# Patient Record
Sex: FEMALE | Race: WHITE | NOT HISPANIC OR LATINO | Employment: FULL TIME | URBAN - METROPOLITAN AREA
[De-identification: names, ages, dates, MRNs, and addresses within clinical notes are randomized per-mention and may not be internally consistent; named-entity substitution may affect disease eponyms.]

---

## 2017-07-10 ENCOUNTER — APPOINTMENT (OUTPATIENT)
Dept: WOMENS IMAGING | Facility: HOSPITAL | Age: 67
End: 2017-07-10

## 2017-07-10 PROCEDURE — 77067 SCR MAMMO BI INCL CAD: CPT | Performed by: RADIOLOGY

## 2017-09-13 ENCOUNTER — HOSPITAL ENCOUNTER (EMERGENCY)
Dept: HOSPITAL 23 - CED | Age: 67
Discharge: HOME | End: 2017-09-13
Payer: COMMERCIAL

## 2017-09-13 VITALS — WEIGHT: 229.99 LBS | HEIGHT: 66 IN | BODY MASS INDEX: 36.96 KG/M2

## 2017-09-13 DIAGNOSIS — Z79.899: ICD-10-CM

## 2017-09-13 DIAGNOSIS — R51: Primary | ICD-10-CM

## 2017-09-13 DIAGNOSIS — I10: ICD-10-CM

## 2017-09-13 LAB
BASOPHIL#: 0.1 X10E3 (ref 0–0.3)
BASOPHIL%: 0.8 % (ref 0–2.5)
BLOOD UREA NITROGEN: 20 MG/DL (ref 9–23)
BUN/CREATININE RATIO: 40
CALCIUM SERUM: 9.2 MG/DL (ref 8.4–10.2)
CK MB SERPL-RTO: 12.3 % (ref 11–15.5)
CK MB SERPL-RTO: 33.1 G/DL (ref 30–36)
CREATININE SERUM: 0.5 MG/DL (ref 0.6–1.4)
DIFF IND: NO
EOSINOPHIL#: 0.3 X10E3 (ref 0–0.7)
EOSINOPHIL%: 3.5 % (ref 0–7)
GLOM FILT RATE ESTIMATED: 101.6 ML/MIN (ref 60–?)
GLUCOSE FASTING: 98 MG/DL (ref 70–110)
HEMATOCRIT: 40.1 % (ref 35–45)
HEMOGLOBIN: 13.3 GM/DL (ref 12–16)
KETONES UR QL: 107 MMOL/L (ref 100–111)
KETONES UR QL: 24 MMOL/L (ref 22–31)
LYMPHOCYTE#: 2.5 X10E3 (ref 1–3.5)
LYMPHOCYTE%: 33.1 % (ref 17–45)
MEAN CELL VOLUME: 89.6 FL (ref 83–96)
MEAN CORPUSCULAR HEMOGLOBIN: 29.7 PG (ref 28–34)
MEAN PLATELET VOLUME: 8.4 FL (ref 6.5–11.5)
METHADONE UR QL SCN: <1 NG/ML (ref 0–7.9)
MONOCYTE#: 0.6 X10E3 (ref 0–1)
MONOCYTE%: 7.4 % (ref 3–12)
NEUTROPHIL#: 4.2 X10E3 (ref 1.5–7.1)
NEUTROPHIL%: 55.2 % (ref 40–75)
PLATELET COUNT: 255 X10E3 (ref 140–420)
POC - TROPONIN: <0.05 NG/ML (ref ?–0.05)
POTASSIUM: 4.1 MMOL/L (ref 3.5–5.1)
RED BLOOD COUNT: 4.48 X10E (ref 3.9–5.3)
SODIUM: 137 MMOL/L (ref 135–145)
WHITE BLOOD COUNT: 7.6 X10E3 (ref 4–10.5)

## 2019-10-02 ENCOUNTER — OFFICE (OUTPATIENT)
Dept: URBAN - METROPOLITAN AREA CLINIC 42 | Facility: CLINIC | Age: 69
End: 2019-10-02
Payer: COMMERCIAL

## 2019-10-02 VITALS
SYSTOLIC BLOOD PRESSURE: 170 MMHG | HEART RATE: 99 BPM | DIASTOLIC BLOOD PRESSURE: 87 MMHG | TEMPERATURE: 98.2 F | HEIGHT: 65 IN | WEIGHT: 240 LBS

## 2019-10-02 DIAGNOSIS — R19.09 OTHER INTRA-ABDOMINAL AND PELVIC SWELLING, MASS AND LUMP: ICD-10-CM

## 2019-10-02 DIAGNOSIS — K21.9 GASTRO-ESOPHAGEAL REFLUX DISEASE WITHOUT ESOPHAGITIS: ICD-10-CM

## 2019-10-02 DIAGNOSIS — Z83.71 FAMILY HISTORY OF COLONIC POLYPS: ICD-10-CM

## 2019-10-02 DIAGNOSIS — Z80.9 FAMILY HISTORY OF MALIGNANT NEOPLASM, UNSPECIFIED: ICD-10-CM

## 2019-10-02 DIAGNOSIS — K57.30 DIVERTICULOSIS OF LARGE INTESTINE WITHOUT PERFORATION OR ABS: ICD-10-CM

## 2019-10-02 DIAGNOSIS — K76.0 FATTY (CHANGE OF) LIVER, NOT ELSEWHERE CLASSIFIED: ICD-10-CM

## 2019-10-02 PROCEDURE — 99244 OFF/OP CNSLTJ NEW/EST MOD 40: CPT

## 2019-10-02 NOTE — SERVICEHPINOTES
Eleanor Dunne is a very pleasant 69-year-old female here in consultation for diverticulosis. She was evaluated in the emergency room a month ago for abdominal pain status post anterior approach lumbar fusion. Abdominal CT revealed incisional post op hematoma/seroma. Incidentally noted fatty liver and diverticulosis.  Her main concern is actually being overdue for colonoscopy. She has family history of colon cancer in her sister and her last colonoscopy being over 10 years ago. BRAt present, she continues to have abdominal pain at site of hematoma. She also describe nonspecific generalized abdominal pain since her back surgery. In addition, she has long history of having acid reflux and heartburn. She has to sleep with head elevated and often waking up with  sour taste in her mouth and hoarseness.  No dysphagia, nausea, vomiting, change in bowel habit or overt GI bleed. Her appetite and weight unchanged.

## 2019-10-28 ENCOUNTER — ON CAMPUS - OUTPATIENT (OUTPATIENT)
Dept: URBAN - METROPOLITAN AREA HOSPITAL 91 | Facility: HOSPITAL | Age: 69
End: 2019-10-28
Payer: COMMERCIAL

## 2019-10-28 DIAGNOSIS — K64.4 RESIDUAL HEMORRHOIDAL SKIN TAGS: ICD-10-CM

## 2019-10-28 DIAGNOSIS — R13.10 DYSPHAGIA, UNSPECIFIED: ICD-10-CM

## 2019-10-28 DIAGNOSIS — K21.9 GASTRO-ESOPHAGEAL REFLUX DISEASE WITHOUT ESOPHAGITIS: ICD-10-CM

## 2019-10-28 DIAGNOSIS — K22.2 ESOPHAGEAL OBSTRUCTION: ICD-10-CM

## 2019-10-28 DIAGNOSIS — Z80.0 FAMILY HISTORY OF MALIGNANT NEOPLASM OF DIGESTIVE ORGANS: ICD-10-CM

## 2019-10-28 DIAGNOSIS — R10.13 EPIGASTRIC PAIN: ICD-10-CM

## 2019-10-28 DIAGNOSIS — K44.9 DIAPHRAGMATIC HERNIA WITHOUT OBSTRUCTION OR GANGRENE: ICD-10-CM

## 2019-10-28 PROCEDURE — 45378 DIAGNOSTIC COLONOSCOPY: CPT | Mod: 33

## 2019-10-28 PROCEDURE — 43249 ESOPH EGD DILATION <30 MM: CPT

## 2019-10-28 PROCEDURE — 43239 EGD BIOPSY SINGLE/MULTIPLE: CPT | Mod: 59

## 2021-07-12 ENCOUNTER — APPOINTMENT (OUTPATIENT)
Dept: CT IMAGING | Facility: HOSPITAL | Age: 71
End: 2021-07-12

## 2021-07-12 ENCOUNTER — HOSPITAL ENCOUNTER (EMERGENCY)
Facility: HOSPITAL | Age: 71
Discharge: HOME | End: 2021-07-12
Attending: EMERGENCY MEDICINE | Admitting: EMERGENCY MEDICINE

## 2021-07-12 ENCOUNTER — APPOINTMENT (OUTPATIENT)
Dept: GENERAL RADIOLOGY | Facility: HOSPITAL | Age: 71
End: 2021-07-12

## 2021-07-12 VITALS
HEART RATE: 75 BPM | SYSTOLIC BLOOD PRESSURE: 150 MMHG | DIASTOLIC BLOOD PRESSURE: 90 MMHG | BODY MASS INDEX: 41.14 KG/M2 | HEIGHT: 66 IN | WEIGHT: 256 LBS | OXYGEN SATURATION: 97 % | RESPIRATION RATE: 16 BRPM | TEMPERATURE: 97.1 F

## 2021-07-12 DIAGNOSIS — S00.03XA CONTUSION OF SCALP, INITIAL ENCOUNTER: ICD-10-CM
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
## 2021-07-12 DIAGNOSIS — S01.111A: ICD-10-CM

## 2021-07-12 DIAGNOSIS — S80.02XA CONTUSION OF LEFT KNEE, INITIAL ENCOUNTER: ICD-10-CM

## 2021-07-12 PROCEDURE — 70450 CT HEAD/BRAIN W/O DYE: CPT

## 2021-07-12 PROCEDURE — 90715 TDAP VACCINE 7 YRS/> IM: CPT | Performed by: NURSE PRACTITIONER

## 2021-07-12 PROCEDURE — 63710000001 ONDANSETRON ODT 4 MG TABLET DISPERSIBLE: Performed by: NURSE PRACTITIONER

## 2021-07-12 PROCEDURE — 90471 IMMUNIZATION ADMIN: CPT | Performed by: NURSE PRACTITIONER

## 2021-07-12 PROCEDURE — 73560 X-RAY EXAM OF KNEE 1 OR 2: CPT

## 2021-07-12 PROCEDURE — 99283 EMERGENCY DEPT VISIT LOW MDM: CPT

## 2021-07-12 PROCEDURE — 25010000002 TDAP 5-2.5-18.5 LF-MCG/0.5 SUSPENSION: Performed by: NURSE PRACTITIONER

## 2021-07-12 PROCEDURE — 72125 CT NECK SPINE W/O DYE: CPT

## 2021-07-12 RX ORDER — HYDROCODONE BITARTRATE AND ACETAMINOPHEN 5; 325 MG/1; MG/1
1 TABLET ORAL ONCE AS NEEDED
Status: COMPLETED | OUTPATIENT
Start: 2021-07-12 | End: 2021-07-12

## 2021-07-12 RX ORDER — ONDANSETRON 4 MG/1
4 TABLET, ORALLY DISINTEGRATING ORAL ONCE
Status: COMPLETED | OUTPATIENT
Start: 2021-07-12 | End: 2021-07-12

## 2021-07-12 RX ORDER — LIDOCAINE HYDROCHLORIDE AND EPINEPHRINE 10; 10 MG/ML; UG/ML
10 INJECTION, SOLUTION INFILTRATION; PERINEURAL ONCE
Status: COMPLETED | OUTPATIENT
Start: 2021-07-12 | End: 2021-07-12

## 2021-07-12 RX ADMIN — HYDROCODONE BITARTRATE AND ACETAMINOPHEN 1 TABLET: 5; 325 TABLET ORAL at 16:59

## 2021-07-12 RX ADMIN — ONDANSETRON 4 MG: 4 TABLET, ORALLY DISINTEGRATING ORAL at 16:30

## 2021-07-12 RX ADMIN — TETANUS TOXOID, REDUCED DIPHTHERIA TOXOID AND ACELLULAR PERTUSSIS VACCINE, ADSORBED 0.5 ML: 5; 2.5; 8; 8; 2.5 SUSPENSION INTRAMUSCULAR at 17:25

## 2021-07-12 RX ADMIN — LIDOCAINE HYDROCHLORIDE,EPINEPHRINE BITARTRATE 10 ML: 10; .01 INJECTION, SOLUTION INFILTRATION; PERINEURAL at 16:28

## 2021-07-12 NOTE — ED PROVIDER NOTES
Pt presents to the ED c/o  laceration over the right eye after tripping and falling at work.  No LOC or blurry vision.     On exam,   Awake, alert, no acute distress.  EENT-normocephalic.  There is a laceration over the right eyelid that has been well approximated by the midlevel provider.  Pupils are equal round reactive light and accommodation.     Plan: CTs of the head and cervical spine are negative for acute fracture.  The patient is safe for discharge with outpatient follow-up.      Appropriate PPE was worn by myself and the patient throughout our entire interaction.       Attestation:  The GUTIERREZ and I have discussed this patient's history, physical exam, and treatment plan.  I have reviewed the documentation and personally had a face to face interaction with the patient. I affirm the documentation and agree with the treatment and plan.  The attached note describes my personal findings.            Florencio Cornell MD  07/12/21 1805

## 2021-07-12 NOTE — DISCHARGE INSTRUCTIONS
For the sutures, apply over the counter topical antibiotic ointment twice a day    Apply it a third time on day of suture removal    Although you are being discharged from the ED today, I encourage you to return for worsening symptoms. Things can, and do, change such that treatment at home with medication may not be adequate. Specifically I recommend returning for chest pain or discomfort, difficulty breathing, persistent vomiting or difficulty holding down liquids or medications, fever > 102.0 F,  or any other worsening or alarming symptoms.     Rest. Drink plenty of fluids.  Follow up with PCP or provider listed for further evaluation and management.  Follow up with primary care provider for further management and to have blood pressure rechecked.  Take all medications as prescribed.

## 2021-07-12 NOTE — ED TRIAGE NOTES
Pt reports falling at work approx 1300. Pt hit head and has lac on eye. Denies LOC. Denies blood thinners. Pt complains of pain in L knee and claims hitting that as well.

## 2021-07-12 NOTE — ED PROVIDER NOTES
EMERGENCY DEPARTMENT ENCOUNTER    Room Number:  05/05  Date seen:  7/12/2021  Time seen: 15:57 EDT  PCP: Noé Wisdom MD  Historian: patient    HPI:  Chief complaint: head injury  A complete HPI/ROS/PMH/PSH/SH/FH are unobtainable due to: not applicable  Context:Shirley Herman is a 71 y.o. female who presents to the ED with c/o trip and fall at work [employer] striking her head on heavy wooden classroom door.  She has a mild laceration to the right orbital area and some moderate left knee pain that is not made better or worse by anything.  She denies LOC she denies blurry or double vision.  She denies current headache.  She is unsure of her last tetanus status.  She has no hand or wrist pain and does have some left knee pain but no range of motion limitations.  She denies any preceding symptoms.  She has not had this problem before.  She is status post cholecystectomy 2 weeks ago.  Patient was placed in face mask in first look. Patient was wearing facemask when I entered the room and throughout our encounter. I wore full protective equipment throughout this patient encounter including a face mask, eye shield and gloves. Hand hygiene/washing of hands was performed before donning protective equipment and after removal when leaving the room.    MEDICAL RECORD REVIEW      ALLERGIES  No known drug allergy    PAST MEDICAL HISTORY  Active Ambulatory Problems     Diagnosis Date Noted   • 2-vessel coronary artery disease 03/10/2016   • Acute bronchitis with bronchospasm 03/10/2016   • Arthritis 03/10/2016   • Blues 03/10/2016   • HLD (hyperlipidemia) 03/10/2016   • BP (high blood pressure) 03/10/2016   • Adult hypothyroidism 03/10/2016   • Disease of thyroid gland 03/10/2016     Resolved Ambulatory Problems     Diagnosis Date Noted   • No Resolved Ambulatory Problems     Past Medical History:   Diagnosis Date   • Acid reflux    • Hypertension    • Ovarian cyst    • Spinal stenosis        PAST SURGICAL HISTORY  Past  Surgical History:   Procedure Laterality Date   • DIAGNOSTIC LAPAROSCOPY N/A 7/28/2016    Procedure: LAPAROSCOPY RT S&O;  Surgeon: Mariposa Camacho MD;  [place] [facility] [place] [facility] [place]:    • HYSTERECTOMY     • JOINT REPLACEMENT Left     MINI REPPLACEMENT   • LUMBAR LAMINECTOMY     • THYROIDECTOMY, PARTIAL     • TOE SURGERY Left     TENDON REPAIR ,TWICE   • TONSILLECTOMY AND ADENOIDECTOMY         FAMILY HISTORY  No family history on file.    SOCIAL HISTORY  Social History     Socioeconomic History   • Marital status:      Spouse name: Not on file   • Number of children: Not on file   • Years of education: Not on file   • Highest education level: Not on file   Tobacco Use   • Smoking status: Never Smoker   • Smokeless tobacco: Never Used   Substance and Sexual Activity   • Alcohol use: Yes     Comment: OCC   • Drug use: No       REVIEW OF SYSTEMS  Review of Systems    All systems reviewed and negative except for those discussed in HPI.     PHYSICAL EXAM    ED Triage Vitals [07/12/21 1403]   Temp Heart Rate Resp BP SpO2   97.1 °F (36.2 °C) 87 18 172/94 96 %      Temp src Heart Rate Source Patient Position BP Location FiO2 (%)   -- Monitor Sitting Left arm --     Physical Exam  HENT:      Head: Normocephalic.      Jaw: There is normal jaw occlusion. No pain on movement.      Nose: Nose normal. No signs of injury.   Eyes:      General: Lids are normal. Vision grossly intact. Gaze aligned appropriately.      Extraocular Movements: Extraocular movements intact.      Right eye: No nystagmus.      Left eye: No nystagmus.        Comments: Lacerations as documented.         I have reviewed the triage vital signs and nursing notes.      GENERAL: not distressed  HENT: nares patent; see diagram above  EYES: no scleral icterus  NECK: no ROM limitations, no cervical spinal tenderness or step-off  CV: regular rhythm, regular rate, murmur, no rubs no gallops  RESPIRATORY: normal effort, to auscultate bilaterally  ABDOMEN:  soft  : deferred  MUSCULOSKELETAL: no deformity    Specifically: No deformity to bilateral hands or wrist.  Shoulder range of motion is normal bilaterally.  The left knee has normal range of motion and complex 90 degrees without any problems.  She is able to ambulate.  There is no thoracic or lumbar pain.  NEURO: alert, moves all extremities, follows commands  SKIN: warm, dry      Laceration Repair    Date/Time: 7/12/2021 4:43 PM  Performed by: Cris Gutierrez APRN  Authorized by: Florencio Cornell MD     Consent:     Consent obtained:  Verbal    Consent given by:  Patient    Risks discussed:  Infection, poor cosmetic result, pain and poor wound healing    Alternatives discussed:  No treatment  Anesthesia (see MAR for exact dosages):     Anesthesia method:  Local infiltration    Local anesthetic:  Lidocaine 1% WITH epi  Laceration details:     Location:  Face    Face location:  R eyebrow (Upper)    Length (cm):  1  Repair type:     Repair type:  Simple  Pre-procedure details:     Preparation:  Patient was prepped and draped in usual sterile fashion and imaging obtained to evaluate for foreign bodies  Exploration:     Wound exploration: wound explored through full range of motion and entire depth of wound probed and visualized      Wound extent: no foreign bodies/material noted, no muscle damage noted, no nerve damage noted, no tendon damage noted, no underlying fracture noted and no vascular damage noted      Contaminated: no    Treatment:     Area cleansed with:  Betadine    Amount of cleaning:  Standard    Irrigation volume:  30    Irrigation method:  Pressure wash    Visualized foreign bodies/material removed: no    Skin repair:     Repair method:  Sutures    Suture size:  6-0    Suture material:  Prolene    Suture technique:  Running  Approximation:     Approximation:  Close  Post-procedure details:     Dressing:  Antibiotic ointment    Patient tolerance of procedure:  Tolerated with difficulty  Laceration  Repair    Date/Time: 7/12/2021 4:44 PM  Performed by: Cris Gutierrez APRN  Authorized by: Florencio Cornell MD     Consent:     Consent obtained:  Verbal    Consent given by:  Patient    Risks discussed:  Pain, poor cosmetic result, poor wound healing and need for additional repair    Alternatives discussed:  No treatment  Anesthesia (see MAR for exact dosages):     Anesthesia method:  Local infiltration  Laceration details:     Location:  Face    Face location:  R eyebrow (Lower)    Length (cm):  1.3  Repair type:     Repair type:  Simple  Pre-procedure details:     Preparation:  Patient was prepped and draped in usual sterile fashion and imaging obtained to evaluate for foreign bodies  Exploration:     Wound exploration: wound explored through full range of motion and entire depth of wound probed and visualized      Wound extent: no fascia violation noted, no foreign bodies/material noted, no muscle damage noted, no nerve damage noted, no tendon damage noted, no underlying fracture noted and no vascular damage noted      Contaminated: no    Treatment:     Area cleansed with:  Betadine    Amount of cleaning:  Standard    Irrigation solution:  Sterile saline    Irrigation volume:  30    Irrigation method:  Pressure wash    Visualized foreign bodies/material removed: no    Skin repair:     Repair method:  Sutures    Suture size:  6-0    Suture material:  Prolene    Suture technique:  Running  Approximation:     Approximation:  Close  Post-procedure details:     Dressing:  Antibiotic ointment      LAB RESULTS  No results found for this or any previous visit (from the past 24 hour(s)).      RADIOLOGY RESULTS  CT Head Without Contrast   Preliminary Result       No CT evidence for acute traumatic intracranial pathology.       TECHNIQUE: CT scan of the cervical spine was obtained with 1 mm axial   bone algorithm and 2 mm axial soft tissue algorithm images. No   intravenous contrast was administered.       FINDINGS:    There is no evidence for acute fracture or bony malalignment involving   the cervical spine.       At C2-3, there is no significant degree of bony canal or foraminal   narrowing.       At C3-4, there is degenerative anterior spondylolisthesis of C3 on C4 by   approximately 1-2 mm. There is no significant degree of bony canal or   foraminal stenosis.       At C4-5, there is mild left foraminal narrowing secondary to   uncovertebral joint hypertrophy.       At C5-6, there is a disc osteophyte complex which results in a mild   degree of canal stenosis. There is moderate left foraminal narrowing   secondary to uncovertebral joint hypertrophy. There is a mild degree of   right foraminal narrowing.       At C6-7, there is no significant degree of canal or foraminal stenosis.       At C7-T1, again there is no significant degree of bony canal or   foraminal narrowing.       IMPRESSION:   There is no evidence for acute fracture or bony malalignment involving   the cervical spine.       Multilevel degenerative phenomena are incidentally noted and discussed   in detail above.           Radiation dose reduction techniques were utilized, including automated   exposure control and exposure modulation based on body size.              CT Cervical Spine Without Contrast   Preliminary Result       No CT evidence for acute traumatic intracranial pathology.       TECHNIQUE: CT scan of the cervical spine was obtained with 1 mm axial   bone algorithm and 2 mm axial soft tissue algorithm images. No   intravenous contrast was administered.       FINDINGS:   There is no evidence for acute fracture or bony malalignment involving   the cervical spine.       At C2-3, there is no significant degree of bony canal or foraminal   narrowing.       At C3-4, there is degenerative anterior spondylolisthesis of C3 on C4 by   approximately 1-2 mm. There is no significant degree of bony canal or   foraminal stenosis.       At C4-5, there is mild left  foraminal narrowing secondary to   uncovertebral joint hypertrophy.       At C5-6, there is a disc osteophyte complex which results in a mild   degree of canal stenosis. There is moderate left foraminal narrowing   secondary to uncovertebral joint hypertrophy. There is a mild degree of   right foraminal narrowing.       At C6-7, there is no significant degree of canal or foraminal stenosis.       At C7-T1, again there is no significant degree of bony canal or   foraminal narrowing.       IMPRESSION:   There is no evidence for acute fracture or bony malalignment involving   the cervical spine.       Multilevel degenerative phenomena are incidentally noted and discussed   in detail above.           Radiation dose reduction techniques were utilized, including automated   exposure control and exposure modulation based on body size.              XR Knee 1 or 2 View Left   Final Result       As described.               This report was finalized on 7/12/2021 2:54 PM by Dr. Elliott Mojica M.D.                PROGRESS, DATA ANALYSIS, CONSULTS AND MEDICAL DECISION MAKING  All labs have been independently reviewed by me.  All radiology studies have been reviewed by me and discussed with radiologist dictating the report.  EKG's independently viewed and interpreted by me unless stated otherwise. Discussion below represents my analysis of pertinent findings related to patient's condition, differential diagnosis, treatment plan and final disposition.     ED Course as of Jul 12 1936   Mon Jul 12, 2021   1613 I have reviewed images on PACS.  My interpretation is no acute fracture or dislocation of the left knee.  Also the CT head is without any subdural, subarachnoid or epidural hematoma.  And I do not see any obvious fractures or dislocations of the CT cervical spine.    I have ordered a tetanus shot for the patient.  One of the right eyebrow lacerations will need a few sutures.    [EW]      ED Course User Index  [EW] Matt  "Cris Sapp, APRN     DDX: head contusion, facial laceration, left knee pain    MDM:  Imaging negative.  Lacerations repaired and wound care discussed.  Pt able to ambulate and no acute deformity or imaging abnormality to left knee     Reviewed pt's history and workup with Dr. Cornell.  After a bedside evaluation, Dr. Cornell agrees with the plan of care.    The patient's history, physical exam, and lab findings were discussed with the physician, who also performed a face to face history and physical exam.  I discussed all results and noted any abnormalities with patient.  Discussed absoute need to recheck abnormalities with their family physician.  I answered any of the patient's questions.  Discussed plan for discharge, as there is no emergent indication for admission.  Pt is agreeable and understands need for follow up and repeat testing.  Pt is aware that discharge does not mean that nothing is wrong but it indicates no emergency is present and they must continue care with their family physician.  Pt is discharged with instructions to follow up with primary care doctor to have their blood pressure rechecked.       Disposition vitals:  /90 (BP Location: Right arm, Patient Position: Lying)   Pulse 75   Temp 97.1 °F (36.2 °C)   Resp 16   Ht 167.6 cm (66\")   Wt 116 kg (256 lb)   SpO2 97%   BMI 41.32 kg/m²       DIAGNOSIS  Final diagnoses:   Fall against object   Laceration of right orbital rim without complication, initial encounter   Contusion of scalp, initial encounter   Contusion of left knee, initial encounter       FOLLOW UP   Noé Wisdom MD  [address] [id number]  [phone]    Schedule an appointment as soon as possible for a visit in 1 week  Sutures to be removed on Monday           Matt Cris Sapp, APRN  07/12/21 1937    "

## 2022-10-21 ENCOUNTER — OFFICE VISIT (OUTPATIENT)
Dept: GASTROENTEROLOGY | Facility: CLINIC | Age: 72
End: 2022-10-21

## 2022-10-21 VITALS
HEART RATE: 115 BPM | OXYGEN SATURATION: 98 % | SYSTOLIC BLOOD PRESSURE: 130 MMHG | BODY MASS INDEX: 41.48 KG/M2 | DIASTOLIC BLOOD PRESSURE: 70 MMHG | TEMPERATURE: 97.6 F | WEIGHT: 249 LBS | HEIGHT: 65 IN

## 2022-10-21 DIAGNOSIS — R10.13 DYSPEPSIA: Chronic | ICD-10-CM

## 2022-10-21 DIAGNOSIS — Z12.11 COLON CANCER SCREENING: ICD-10-CM

## 2022-10-21 DIAGNOSIS — R19.7 DIARRHEA, UNSPECIFIED TYPE: Chronic | ICD-10-CM

## 2022-10-21 DIAGNOSIS — R15.2 FECAL URGENCY: ICD-10-CM

## 2022-10-21 DIAGNOSIS — R11.2 NAUSEA AND VOMITING, UNSPECIFIED VOMITING TYPE: ICD-10-CM

## 2022-10-21 DIAGNOSIS — R19.4 CHANGE IN BOWEL HABITS: Primary | ICD-10-CM

## 2022-10-21 DIAGNOSIS — K52.839 MICROSCOPIC COLITIS, UNSPECIFIED MICROSCOPIC COLITIS TYPE: ICD-10-CM

## 2022-10-21 DIAGNOSIS — Z86.010 HX OF ADENOMATOUS COLONIC POLYPS: ICD-10-CM

## 2022-10-21 DIAGNOSIS — K21.9 GASTROESOPHAGEAL REFLUX DISEASE, UNSPECIFIED WHETHER ESOPHAGITIS PRESENT: Chronic | ICD-10-CM

## 2022-10-21 PROCEDURE — 99214 OFFICE O/P EST MOD 30 MIN: CPT | Performed by: NURSE PRACTITIONER

## 2022-10-21 RX ORDER — CELECOXIB 200 MG/1
1 CAPSULE ORAL DAILY
COMMUNITY
Start: 2021-06-23

## 2022-10-21 RX ORDER — ROSUVASTATIN CALCIUM 10 MG/1
1 TABLET, COATED ORAL DAILY
COMMUNITY
Start: 2022-09-06

## 2022-10-21 RX ORDER — BUDESONIDE 3 MG/1
CAPSULE, COATED PELLETS ORAL
Qty: 168 CAPSULE | Refills: 0 | Status: SHIPPED | OUTPATIENT
Start: 2022-10-21 | End: 2023-01-27

## 2022-10-21 RX ORDER — HYDROCHLOROTHIAZIDE 12.5 MG/1
TABLET ORAL
COMMUNITY
Start: 2022-10-03

## 2022-10-21 RX ORDER — OMEPRAZOLE 40 MG/1
CAPSULE, DELAYED RELEASE ORAL
COMMUNITY
Start: 2021-06-23

## 2022-10-21 RX ORDER — ONDANSETRON 4 MG/1
4 TABLET, FILM COATED ORAL EVERY 8 HOURS PRN
Qty: 90 TABLET | Refills: 3 | Status: SHIPPED | OUTPATIENT
Start: 2022-10-21 | End: 2022-11-05

## 2022-10-21 RX ORDER — CYANOCOBALAMIN 1000 UG/ML
INJECTION, SOLUTION INTRAMUSCULAR; SUBCUTANEOUS
COMMUNITY
Start: 2022-10-12

## 2022-10-21 RX ORDER — TRAZODONE HYDROCHLORIDE 50 MG/1
TABLET ORAL
COMMUNITY
Start: 2022-10-17

## 2022-10-21 NOTE — PROGRESS NOTES
Chief Complaint   Patient presents with   • Abdominal Pain   • Vomiting   • Nausea   • Diarrhea         History of Present Illness  72-year old female presents today for evaluation of abdominal pain and diarrhea.  She reports that beginning in April she started to have loose stools.  She then contracted COVID twice in May.  She states that her bowel habits vary and she could have no bowel movement for 2 to 3 days and experienced diarrhea for multiple days in a row.  Prior to this current bowel pattern, she was occasionally constipated.  She will experience fecal urgency within 30 minutes of eating and stool is always loose.  She has been taking MiraLAX 1 cap daily due to her history of constipation and fear of not being able to have a bowel movement.  She was seen and evaluated by Dr. Henley and underwent an EGD and colonoscopy in September.  Colonoscopy did not provide a good bowel prep and it was repeated, demonstrating biopsies consistent with microscopic colitis.  Patient was not treated with any type of medication therapy nor was she contacted with results.  She denies any melena or hematochezia.  She states that she has some abdominal discomfort in the center of her abdomen.    CT of the abdomen and pelvis on 9/8/2022 revealed fatty liver and a mild bobby mesentery.  She reports having some significant nausea and vomiting intermittently, of which Zofran would help.  This seems to coincide with her diarrhea.  Symptoms are more dyspeptic in nature.  She takes omeprazole daily.    Review of Systems   Constitutional: Positive for fatigue. Negative for unexpected weight change.   HENT: Negative for trouble swallowing.    Cardiovascular: Negative for chest pain.   Gastrointestinal: Positive for abdominal pain, diarrhea, nausea and vomiting. Negative for abdominal distention, anal bleeding, blood in stool, constipation and rectal pain.      Result Review :       CBC AND DIFFERENTIAL (08/31/2022 18:30)  COMPREHENSIVE  "METABOLIC PANEL (08/31/2022 18:30)  US abdomen complete (08/23/2022 09:33)    Vital Signs:   /70   Pulse 115   Temp 97.6 °F (36.4 °C)   Ht 165.1 cm (65\")   Wt 113 kg (249 lb)   SpO2 98%   BMI 41.44 kg/m²     Body mass index is 41.44 kg/m².     Physical Exam  Vitals reviewed.   Constitutional:       Appearance: Normal appearance.   Pulmonary:      Effort: Pulmonary effort is normal. No respiratory distress.   Abdominal:      General: Abdomen is flat. Bowel sounds are normal. There is no distension.      Palpations: Abdomen is soft. There is no mass.      Tenderness: There is abdominal tenderness. There is no guarding.      Comments: Mild tenderness noted bilaterally in lower abdomen on palpation   Musculoskeletal:         General: Normal range of motion.   Skin:     General: Skin is warm and dry.   Neurological:      General: No focal deficit present.      Mental Status: She is alert and oriented to person, place, and time.   Psychiatric:         Mood and Affect: Mood normal.         Behavior: Behavior normal.         Thought Content: Thought content normal.         Judgment: Judgment normal.       Assessment and Plan    Diagnoses and all orders for this visit:    1. Change in bowel habits (Primary)    2. Microscopic colitis, unspecified microscopic colitis type    3. Diarrhea, unspecified type    4. Fecal urgency    5. Hx of adenomatous colonic polyps    6. Colon cancer screening    7. Dyspepsia    8. Gastroesophageal reflux disease, unspecified whether esophagitis present    9. Nausea and vomiting, unspecified vomiting type    Other orders  -     Budesonide (ENTOCORT EC) 3 MG 24 hr capsule; Take 3 tabs each morning x 6 weeks, then 2 tab each morning x 2 weeks, then 1 tab each morning x 2 weeks  Dispense: 168 capsule; Refill: 0  -     ondansetron (ZOFRAN) 4 MG tablet; Take 1 tablet by mouth Every 8 (Eight) Hours As Needed for Nausea or Vomiting for up to 15 days.  Dispense: 90 tablet; Refill: 3     "       Patient Instructions   1.  For GERD, continue omeprazole 40 mg once daily.    2.  For dyspepsia and nausea and vomiting we recommend FD guard.  This is a medical food available over-the-counter at a local grocery or pharmacy.  We recommend following package instructions for use.    3.  Refills have been provided for Zofran.  You may continue to use this medication for management of your nausea and vomiting.    4.  For microscopic colitis noted on most recent pathology report we will place you on a course of budesonide.  This is a gut specific steroid to help decrease the inflammation.  This will help to improve and regulate your bowel habits.  Please follow-up directions for use.  You will take 3 tabs each morning x6 weeks, then 2 tabs each morning x2 weeks, then 1 tab each morning x2 weeks.    5.  Due to your history of colon polyps on your last colonoscopy, we will place you in recall for your next colonoscopy at a 5-year interval which will be due September 2027.    6.  Plan for office follow-up in 3 months for reassessment of symptoms, or sooner should symptoms worsen or fail to improve.     Discussion:    APRN reviewed patient's previous colonoscopy, EGD, and pathology reports from [facility].  Procedure performed by Dr. Henley.  Patient was noted to have microscopic colitis and did not receive treatment.  We will place her on a course of budesonide over the course of 10 weeks at a tapering dose.    For GERD she will continue omeprazole 40 mg once daily.  We have recommended FD guard for management of dyspepsia and Zofran for management of nausea and vomiting.  To consider gastric emptying study or hydrogen breath testing at next follow-up if upper GI symptoms have not resolved with above plan of care.  Due to findings of an adenomatous colon polyp on her most recent colonoscopy, we will place her in recall for her next colonoscopy in 5 years.  We will plan for office follow-up in 3 months for  reassessment of symptoms, or sooner should symptoms worsen or fail to improve.  Patient verbalized understanding of above plan of care and is in agreement.  All questions answered and support provided.     EMR Dragon/Transcription Disclaimer:  This document has been Dictated utilizing Dragon dictation.

## 2022-10-21 NOTE — PATIENT INSTRUCTIONS
1.  For GERD, continue omeprazole 40 mg once daily.    2.  For dyspepsia and nausea and vomiting we recommend FD guard.  This is a medical food available over-the-counter at a local grocery or pharmacy.  We recommend following package instructions for use.    3.  Refills have been provided for Zofran.  You may continue to use this medication for management of your nausea and vomiting.    4.  For microscopic colitis noted on most recent pathology report we will place you on a course of budesonide.  This is a gut specific steroid to help decrease the inflammation.  This will help to improve and regulate your bowel habits.  Please follow-up directions for use.  You will take 3 tabs each morning x6 weeks, then 2 tabs each morning x2 weeks, then 1 tab each morning x2 weeks.    5.  Due to your history of colon polyps on your last colonoscopy, we will place you in recall for your next colonoscopy at a 5-year interval which will be due September 2027.    6.  Plan for office follow-up in 3 months for reassessment of symptoms, or sooner should symptoms worsen or fail to improve.

## 2022-11-10 ENCOUNTER — TELEPHONE (OUTPATIENT)
Dept: GASTROENTEROLOGY | Facility: CLINIC | Age: 72
End: 2022-11-10

## 2022-11-10 NOTE — TELEPHONE ENCOUNTER
Patient is requesting a work note. She stated that her employer is asking for this ASAP. Patient is asking if work note can cover from  day of discharge which was 10- to  11-.  Patient is asking to be faxed to [phone].

## 2023-01-27 ENCOUNTER — OFFICE VISIT (OUTPATIENT)
Dept: GASTROENTEROLOGY | Facility: CLINIC | Age: 73
End: 2023-01-27
Payer: COMMERCIAL

## 2023-01-27 VITALS
WEIGHT: 244 LBS | HEIGHT: 65 IN | HEART RATE: 114 BPM | SYSTOLIC BLOOD PRESSURE: 140 MMHG | DIASTOLIC BLOOD PRESSURE: 84 MMHG | BODY MASS INDEX: 40.65 KG/M2 | TEMPERATURE: 97.3 F | OXYGEN SATURATION: 97 %

## 2023-01-27 DIAGNOSIS — R19.7 DIARRHEA, UNSPECIFIED TYPE: ICD-10-CM

## 2023-01-27 DIAGNOSIS — K21.9 GASTROESOPHAGEAL REFLUX DISEASE, UNSPECIFIED WHETHER ESOPHAGITIS PRESENT: ICD-10-CM

## 2023-01-27 DIAGNOSIS — K52.839 MICROSCOPIC COLITIS, UNSPECIFIED MICROSCOPIC COLITIS TYPE: Primary | ICD-10-CM

## 2023-01-27 DIAGNOSIS — K76.0 FATTY LIVER: ICD-10-CM

## 2023-01-27 PROCEDURE — 99214 OFFICE O/P EST MOD 30 MIN: CPT | Performed by: NURSE PRACTITIONER

## 2023-01-27 RX ORDER — MECLIZINE HYDROCHLORIDE 25 MG/1
25 TABLET ORAL
COMMUNITY
Start: 2022-12-05 | End: 2023-12-05

## 2023-01-27 NOTE — PROGRESS NOTES
"Chief Complaint   Patient presents with   • Follow-up     Microscopic colitis, GERD, fatty liver         History of Present Illness  72-year-old female presents the office today for follow-up.  She was last seen in office on 10/21/2022.  She has a history of GERD, abdominal pain, fatty liver, GERD, colitis, and diarrhea.  She continues omeprazole 40 mg once daily.  FD guard was recommended at her last office visit for symptom management.She has undergone esophageal dilation in the past. She denies any current trouble swallowing. She reports nausea with very infrequent emesis.     For microscopic colitis, was placed on a course of budesonide at her last office visit. She completed the course of budesonide. Bowel movements started to improve significantly. Loose stools are much less frequent. She will have fecal urgency and loose stools occasionally after eating. Symptoms generally will occur if she consumes fried foods or a large salad. She does not tolerate spicy foods well either. She reports that she can have 3 Bms in one day and sometimes go 2-3 days between BMs. She tried IBGard and took for 2-3 weeks, but felt that it was making her nausea worse and she discontinued.     She will have some abdominal discomfort in her right lower abdomen particularly if she has gone 2-3 days between BMs. She does not take a fiber supplement.     Next surveillance colonoscopy due September 2027 due to her history of colon polyps.    She continues to report fatigue, body aches, and joint pain. She reports brain fog and has trouble collecting her thoughts at times. She also reports a sense of jitteryness in the evening. She feels a \"little off\" and wonders if she has a balance issue. She has had vertigo once in the past. These symptoms are not as severe as what they were in the past. She denies any memory issues. She is not forgetful and does not misplace things.     Review of Systems   Constitutional: Negative for fever and " "unexpected weight change.   HENT: Negative for trouble swallowing.    Cardiovascular: Negative for chest pain.   Gastrointestinal: Positive for abdominal pain, constipation and diarrhea. Negative for abdominal distention, anal bleeding, blood in stool and rectal pain.   Psychiatric/Behavioral: Positive for decreased concentration.      Result Review :       Office Visit with Sonja Peña APRN (10/21/2022)  CT Abdomen Pelvis With Contrast (08/31/2022 19:19)  COMPREHENSIVE METABOLIC PANEL (08/31/2022 18:30)    Vital Signs:   /84   Pulse 114   Temp 97.3 °F (36.3 °C)   Ht 165.1 cm (65\")   Wt 111 kg (244 lb)   SpO2 97%   BMI 40.60 kg/m²     Body mass index is 40.6 kg/m².     Physical Exam  Vitals reviewed.   Constitutional:       Appearance: Normal appearance.   Pulmonary:      Effort: Pulmonary effort is normal. No respiratory distress.   Abdominal:      General: Abdomen is flat. Bowel sounds are normal. There is no distension.      Palpations: Abdomen is soft. There is no mass.      Tenderness: There is abdominal tenderness. There is no guarding.      Comments: Mild abdominal pain noted in suprapubic area on palpation   Musculoskeletal:         General: Normal range of motion.   Skin:     General: Skin is warm and dry.   Neurological:      General: No focal deficit present.      Mental Status: She is alert and oriented to person, place, and time.   Psychiatric:         Mood and Affect: Mood normal.         Behavior: Behavior normal.         Thought Content: Thought content normal.         Judgment: Judgment normal.       Assessment and Plan    Diagnoses and all orders for this visit:    1. Microscopic colitis, unspecified microscopic colitis type (Primary)    2. Diarrhea, unspecified type    3. Gastroesophageal reflux disease, unspecified whether esophagitis present    4. Fatty liver  -     Hepatic Function Panel          Patient Instructions   1. Continue omeprazole 40 mg once daily. You may take a " second dose on days when your symptoms are worse.     2.   For dyspepsia, we recommend avoiding trigger foods.     3.   We recommend use of a daily fiber supplement such as Citrucel, Metamucil, Benefiber or FiberCon tablets. If taking a fiber tablet we recommend that you start with one cap daily each morning with a full 8 oz. Glass of water. You may increase your dosing accordingly based upon how your bowel habits are going to respond. If taking the mix in type of fiber, we recommend starting with one serving per day and increasing accordingly based on your response.     4.  If your diarrhea recurs and stays, you may use Imodium and follow package instructions for use.     5. For brain fog as well as your fatigue, body aches and joint pain we recommend that you discuss with your primary care physician for possible referrals to rheumatology and/or neurology.     6.  Next surveillance colonoscopy due September 2027 due to your history of colon polyps.     7.  Recommend hepatic function panel for ongoing monitoring of fatty liver. For fatty liver, the recommended treatment is weight loss along with regular exercise, avoidance of foods containing high fructose corn syrup, and alcohol avoidance. We also recommend daily supplementation with milk thistle.  This is an antioxidant that supports liver health and is available over-the-counter at your local grocery or pharmacy.    Discussion:    GERD seems to be well managed with omeprazole 40 mg daily which we will continue.  Recommend avoidance of trigger foods.    Microscopic colitis seems to have resolved following course of budesonide.  However, patient's bowel movements to swing between constipation and loose stool and we have recommended use of a daily fiber supplement.  Should her diarrhea recur secondary to the microscopic colitis, we have recommended use of Imodium.  Next surveillance colonoscopy due September 2027 due to history of colon polyps.    For fatty liver we  will check a hepatic function panel today and lifestyle modifications were recommended.  We recommend office follow-up in 6 months for reassessment of symptoms, or sooner should symptoms worsen/recur.    Patient was recommended to discuss with her primary care physician her concerns over fatigue, body aches, joint pain, and brain fog.  She may require referrals to both neurology and rheumatology.  Patient reports that she will follow-up with her PCP.  Patient verbalized understanding of above plan of care and is in agreement.  All questions answered and support provided.           EMR Dragon/Transcription Disclaimer:  This document has been Dictated utilizing Dragon dictation.

## 2023-01-27 NOTE — PATIENT INSTRUCTIONS
Continue omeprazole 40 mg once daily. You may take a second dose on days when your symptoms are worse.     2.   For dyspepsia, we recommend avoiding trigger foods.     3.   We recommend use of a daily fiber supplement such as Citrucel, Metamucil, Benefiber or FiberCon tablets. If taking a fiber tablet we recommend that you start with one cap daily each morning with a full 8 oz. Glass of water. You may increase your dosing accordingly based upon how your bowel habits are going to respond. If taking the mix in type of fiber, we recommend starting with one serving per day and increasing accordingly based on your response.     4.  If your diarrhea recurs and stays, you may use Imodium and follow package instructions for use.     5. For brain fog as well as your fatigue, body aches and joint pain we recommend that you discuss with your primary care physician for possible referrals to rheumatology and/or neurology.     6.  Next surveillance colonoscopy due September 2027 due to your history of colon polyps.     7.  Recommend hepatic function panel for ongoing monitoring of fatty liver. For fatty liver, the recommended treatment is weight loss along with regular exercise, avoidance of foods containing high fructose corn syrup, and alcohol avoidance. We also recommend daily supplementation with milk thistle.  This is an antioxidant that supports liver health and is available over-the-counter at your local grocery or pharmacy.

## 2023-01-28 LAB
ALBUMIN SERPL-MCNC: 4.7 G/DL (ref 3.5–5.2)
ALP SERPL-CCNC: 96 U/L (ref 39–117)
ALT SERPL-CCNC: 12 U/L (ref 1–33)
AST SERPL-CCNC: 15 U/L (ref 1–32)
BILIRUB DIRECT SERPL-MCNC: <0.2 MG/DL (ref 0–0.3)
BILIRUB SERPL-MCNC: 0.3 MG/DL (ref 0–1.2)
PROT SERPL-MCNC: 7.2 G/DL (ref 6–8.5)

## 2023-08-11 ENCOUNTER — OFFICE VISIT (OUTPATIENT)
Dept: GASTROENTEROLOGY | Facility: CLINIC | Age: 73
End: 2023-08-11
Payer: MEDICARE

## 2023-08-11 VITALS
BODY MASS INDEX: 42.55 KG/M2 | OXYGEN SATURATION: 95 % | SYSTOLIC BLOOD PRESSURE: 138 MMHG | HEART RATE: 133 BPM | DIASTOLIC BLOOD PRESSURE: 80 MMHG | WEIGHT: 255.4 LBS | TEMPERATURE: 97.6 F | HEIGHT: 65 IN

## 2023-08-11 DIAGNOSIS — K63.5 POLYP OF COLON, UNSPECIFIED PART OF COLON, UNSPECIFIED TYPE: ICD-10-CM

## 2023-08-11 DIAGNOSIS — K21.9 GASTROESOPHAGEAL REFLUX DISEASE, UNSPECIFIED WHETHER ESOPHAGITIS PRESENT: Primary | Chronic | ICD-10-CM

## 2023-08-11 DIAGNOSIS — R14.0 BLOATING: ICD-10-CM

## 2023-08-11 DIAGNOSIS — K52.839 MICROSCOPIC COLITIS, UNSPECIFIED MICROSCOPIC COLITIS TYPE: ICD-10-CM

## 2023-08-11 DIAGNOSIS — R11.2 NAUSEA AND VOMITING, UNSPECIFIED VOMITING TYPE: ICD-10-CM

## 2023-08-11 DIAGNOSIS — Z12.11 COLON CANCER SCREENING: ICD-10-CM

## 2023-08-11 DIAGNOSIS — G89.29 ABDOMINAL PAIN, CHRONIC, BILATERAL LOWER QUADRANT: Chronic | ICD-10-CM

## 2023-08-11 DIAGNOSIS — R11.0 NAUSEA: ICD-10-CM

## 2023-08-11 DIAGNOSIS — K76.0 FATTY LIVER: ICD-10-CM

## 2023-08-11 DIAGNOSIS — R14.2 BELCHING: ICD-10-CM

## 2023-08-11 DIAGNOSIS — R10.32 ABDOMINAL PAIN, CHRONIC, BILATERAL LOWER QUADRANT: Chronic | ICD-10-CM

## 2023-08-11 DIAGNOSIS — R10.31 ABDOMINAL PAIN, CHRONIC, BILATERAL LOWER QUADRANT: Chronic | ICD-10-CM

## 2023-08-11 PROCEDURE — 3079F DIAST BP 80-89 MM HG: CPT | Performed by: NURSE PRACTITIONER

## 2023-08-11 PROCEDURE — 99214 OFFICE O/P EST MOD 30 MIN: CPT | Performed by: NURSE PRACTITIONER

## 2023-08-11 PROCEDURE — 3075F SYST BP GE 130 - 139MM HG: CPT | Performed by: NURSE PRACTITIONER

## 2023-08-11 PROCEDURE — 1160F RVW MEDS BY RX/DR IN RCRD: CPT | Performed by: NURSE PRACTITIONER

## 2023-08-11 PROCEDURE — 1159F MED LIST DOCD IN RCRD: CPT | Performed by: NURSE PRACTITIONER

## 2023-08-11 RX ORDER — PREDNISONE 5 MG/1
5 TABLET ORAL DAILY
Qty: 30 TABLET | Refills: 1 | Status: SHIPPED | OUTPATIENT
Start: 2023-08-11

## 2023-08-11 NOTE — PROGRESS NOTES
Chief Complaint   Patient presents with    Follow-up     GERD, fatty liver, microscopic colitis, nausea, abdominal pain         History of Present Illness  73-year-old female presents the office today for follow-up.  She was last seen in office on 1/27/2023.  She has a history of GERD, esophageal dilation, fatty liver, microscopic colitis, and diarrhea.    For GERD she continues omeprazole 40 mg once daily and implement antireflux precautions. She is not noticing her reflux at this time. She reports nausea that has been ongoing for several months. Nausea does not occur daily, rather occurs maybe once per week. At times she will have emesis and did this morning. Emesis is only bile and generally occurs in the morning. She started Prevagen (OTC) for focus which is available over the counter. She has not noticed too much of a change. She reports early satiety and has not been eating normal sized meals for a long time. She is not diabetic. She reports abdominal bloating, belching particularly after eating. She had a cholecystectomy a couple of years ago. Nausea is her most problematic symptom. She has tried both Zofran and phenergan in the past for other issues.     For diarrhea, patient was recommended to start a daily fiber supplement and utilize Imodium at her last office visit. She reports having a BM almost daily and are more formed. She seldom has a hard stool. Stools are soft and there seems to be a greenish brown color with the presence of some mucous-but this is somewhat better than compared to prior pattern.     She reports lower abdominal pain that has been present for 1.5-2 yrs that comes and goes. Pain is sharp to the touch. She reports that lying down feels better than sitting. If she leans against a counter it will hurt. Pain is not constant, but she will always have discomfort it she touches this area in her lower abdomen. She has had a complete hysterectomy and oophorectomy. She had a CT of the abdomen  "and pelvis and was noted to have a mild bobby mesentary and fatty liver. Last hepatic function panel was performed on 1/27/2023 with normal values.    He underwent EGD and colonoscopy with Dr. Henley on 9/7/2022. EGD bx were normal. She then underwent EGD and colonoscopy on 9/21/2022 with Dr. Henley and was noted to have microscopic colitis and a mix of hyperplastic and adenomatous polyps.     She has a history of colon polyps and is due for her next surveillance colonoscopy September 2027.    She follows up with her PCP today.      Result Review :       Office Visit with Sonja Peña APRN (01/27/2023)   CT Abdomen Pelvis With Contrast (08/31/2022 19:19)   Hepatic Function Panel (01/27/2023 09:37)     Vital Signs:   /80   Pulse (!) 133   Temp 97.6 øF (36.4 øC)   Ht 165.1 cm (65\")   Wt 116 kg (255 lb 6.4 oz)   SpO2 95%   BMI 42.50 kg/mý     Body mass index is 42.5 kg/mý.     Physical Exam  Vitals reviewed.   Constitutional:       Appearance: Normal appearance.   Pulmonary:      Effort: Pulmonary effort is normal. No respiratory distress.   Abdominal:      General: Abdomen is flat. Bowel sounds are normal. There is no distension.      Palpations: Abdomen is soft. There is no mass.      Tenderness: There is abdominal tenderness. There is no guarding.      Comments: Bilateral discomfort on deep palpation of right and left pelvis     Musculoskeletal:         General: Normal range of motion.   Skin:     General: Skin is warm and dry.   Neurological:      General: No focal deficit present.      Mental Status: She is alert and oriented to person, place, and time.   Psychiatric:         Mood and Affect: Mood normal.         Behavior: Behavior normal.         Thought Content: Thought content normal.         Judgment: Judgment normal.     Assessment and Plan    Diagnoses and all orders for this visit:    1. Gastroesophageal reflux disease, unspecified whether esophagitis present (Primary)    2. Microscopic " colitis, unspecified microscopic colitis type    3. Fatty liver    4. Polyp of colon, unspecified part of colon, unspecified type    5. Colon cancer screening    6. Nausea  -     Cancel: Breath Hydrogen Test; Future  -     Breath Hydrogen Test; Future    7. Bloating  -     Cancel: Breath Hydrogen Test; Future  -     Breath Hydrogen Test; Future    8. Belching  -     Cancel: Breath Hydrogen Test; Future  -     Breath Hydrogen Test; Future    9. Nausea and vomiting, unspecified vomiting type  -     Cancel: Breath Hydrogen Test; Future  -     Breath Hydrogen Test; Future    10. Abdominal pain, chronic, bilateral lower quadrant    Other orders  -     predniSONE (DELTASONE) 5 MG tablet; Take 1 tablet by mouth Daily.  Dispense: 30 tablet; Refill: 1           Patient Instructions   For further evaluation of nausea, bloating, belching we will order hydrogen breath testing. You will be contacted to schedule this test.     2.  Continue omeprazole 40 mg once daily.     3.   For possible mesenteric sclerosis we have prescribed prednisone 5 mg-low dose- to see if this helps to improve your abdominal pain. Please discuss this with your PCP to determine if this is something you would like to try for your chronic lower abdominal discomfort.     4.  Recommend 3 month follow up for reassessment or sooner should symptoms worsen or fail to improve.       Discussion:  We will check the patient for small bacterial overgrowth via hydrogen breath testing due to reports of nausea, bloating, and intermittent emesis.  Patient to continue omeprazole 40 mg once daily.    Due to findings on her last CT scan for possible mesenteric sclerosis, we have recommended low-dose prednisone at 5 mg daily to see if this helps to improve her symptoms.  Patient has an upcoming appointment with her PCP today and will discuss further with her to determine if this would be an option/would be recommended by her as well.  Patient has had a complete hysterectomy  and gynecological etiology has been ruled out.    We will plan for office follow-up in 3 months for reassessment of symptoms or sooner should symptoms worsen or fail to improve.  Also to consider gastric team study for upper GI symptoms.  Next colonoscopy due September 2027 for colon cancer surveillance.  Patient verbalized understanding of above plan of care and is in agreement.  All questions answered and support provided.    EMR Dragon/Transcription Disclaimer:  This document has been Dictated utilizing Dragon dictation.

## 2023-08-11 NOTE — PATIENT INSTRUCTIONS
For further evaluation of nausea, bloating, belching we will order hydrogen breath testing. You will be contacted to schedule this test.     2.  Continue omeprazole 40 mg once daily.     3.   For possible mesenteric sclerosis we have prescribed prednisone 5 mg-low dose- to see if this helps to improve your abdominal pain. Please discuss this with your PCP to determine if this is something you would like to try for your chronic lower abdominal discomfort.     4.  Recommend 3 month follow up for reassessment or sooner should symptoms worsen or fail to improve.

## 2023-08-15 RX ORDER — ONDANSETRON 4 MG/1
4 TABLET, FILM COATED ORAL EVERY 8 HOURS PRN
Qty: 60 TABLET | Refills: 2 | Status: SHIPPED | OUTPATIENT
Start: 2023-08-15 | End: 2023-10-14

## 2023-09-13 DIAGNOSIS — R14.0 BLOATING: ICD-10-CM

## 2023-09-13 DIAGNOSIS — R11.0 NAUSEA: ICD-10-CM

## 2023-09-13 DIAGNOSIS — R14.2 BELCHING: ICD-10-CM

## 2023-09-13 DIAGNOSIS — R11.2 NAUSEA AND VOMITING, UNSPECIFIED VOMITING TYPE: ICD-10-CM

## 2023-09-13 RX ORDER — AMOXICILLIN AND CLAVULANATE POTASSIUM 875; 125 MG/1; MG/1
1 TABLET, FILM COATED ORAL 2 TIMES DAILY
Qty: 20 TABLET | Refills: 0 | Status: SHIPPED | OUTPATIENT
Start: 2023-09-13 | End: 2023-09-23

## 2023-10-11 RX ORDER — PREDNISONE 5 MG/1
2.5 TABLET ORAL DAILY
Qty: 15 TABLET | Refills: 1 | Status: SHIPPED | OUTPATIENT
Start: 2023-10-11 | End: 2023-12-10

## 2023-11-17 ENCOUNTER — OFFICE VISIT (OUTPATIENT)
Dept: GASTROENTEROLOGY | Facility: CLINIC | Age: 73
End: 2023-11-17
Payer: MEDICARE

## 2023-11-17 VITALS
HEART RATE: 102 BPM | WEIGHT: 263.4 LBS | BODY MASS INDEX: 43.89 KG/M2 | HEIGHT: 65 IN | OXYGEN SATURATION: 98 % | SYSTOLIC BLOOD PRESSURE: 138 MMHG | DIASTOLIC BLOOD PRESSURE: 82 MMHG | TEMPERATURE: 98 F

## 2023-11-17 DIAGNOSIS — K52.839 MICROSCOPIC COLITIS, UNSPECIFIED MICROSCOPIC COLITIS TYPE: Primary | Chronic | ICD-10-CM

## 2023-11-17 DIAGNOSIS — R11.0 NAUSEA: Chronic | ICD-10-CM

## 2023-11-17 DIAGNOSIS — R14.0 BLOATING: Chronic | ICD-10-CM

## 2023-11-17 DIAGNOSIS — K21.9 GASTROESOPHAGEAL REFLUX DISEASE, UNSPECIFIED WHETHER ESOPHAGITIS PRESENT: Chronic | ICD-10-CM

## 2023-11-17 DIAGNOSIS — R93.5 ABNORMAL CT OF THE ABDOMEN: ICD-10-CM

## 2023-11-17 DIAGNOSIS — Z12.11 COLON CANCER SCREENING: ICD-10-CM

## 2023-11-17 DIAGNOSIS — D12.6 ADENOMATOUS POLYP OF COLON, UNSPECIFIED PART OF COLON: ICD-10-CM

## 2023-11-17 DIAGNOSIS — R14.2 BELCHING: Chronic | ICD-10-CM

## 2023-11-17 RX ORDER — PREDNISONE 5 MG/1
2.5 TABLET ORAL DAILY
Qty: 45 TABLET | Refills: 3 | Status: SHIPPED | OUTPATIENT
Start: 2023-11-17 | End: 2024-11-16

## 2023-11-17 RX ORDER — ONDANSETRON 4 MG/1
TABLET, FILM COATED ORAL
COMMUNITY
Start: 2023-08-14

## 2023-11-17 NOTE — PROGRESS NOTES
"Chief Complaint   Patient presents with    Follow-up     GERD, bobby mesentery, fatty liver, colon polyps         History of Present Illness  73-year-old female presents the office today for follow-up.  She was last seen in office on 8/11/2023.  She has a history of GERD, esophageal dilation, fatty liver, microscopic colitis, and diarrhea.    GERD is well managed with omeprazole 40 mg once daily and antireflux precautions.  He also has a history of nausea,intermittent bile emesis, and early satiety. Nausea is much better, and if she has nausea it is usually brought on by exertion. She is not a diabetic.  She has a history of cholecystectomy.    Bowel habits are regular and occur on most days.  She does have a history of diarrhea.She reports alternating bowel habits. Stool is seldom dry or hard. Stool is softer and small. Bowel habits vary. Sometimes she can skip a day between Bms, but most days she defecates.     He has had a history of abdominal pain underwent CT of the abdomen and pelvis on 8/31/2022 which revealed a bobby mesentery and fatty liver.  She has placed on low-dose prednisone at 2.5 mg once daily and has noticed improvement in symptoms.  She was previously taking 5 mg daily of prednisone. She feels that her gut is less tender, occasionally she will have some discomfort. She does not take a calcium supplement.     She underwent EGD and colonoscopy with Dr. Henley on 9/21/2022 and was noted to have microscopic colitis on biopsy and a mix of hyperplastic and adenomatous colon polyps.  Next colonoscopy due September 2027.    Result Review :       Office Visit with Sonja Peña APRN (08/11/2023)   Breath Hydrogen Test (09/13/2023 13:47)   CT Abdomen Pelvis With Contrast (08/31/2022 19:19)     Vital Signs:   /82   Pulse 102   Temp 98 °F (36.7 °C)   Ht 165.1 cm (65\")   Wt 119 kg (263 lb 6.4 oz)   SpO2 98%   BMI 43.83 kg/m²     Body mass index is 43.83 kg/m².     Physical Exam  Vitals " reviewed.   Constitutional:       Appearance: Normal appearance.   Pulmonary:      Effort: Pulmonary effort is normal. No respiratory distress.   Abdominal:      General: Abdomen is flat. Bowel sounds are normal. There is no distension.      Palpations: Abdomen is soft. There is no mass.      Tenderness: There is no abdominal tenderness. There is no guarding.   Musculoskeletal:         General: Normal range of motion.   Skin:     General: Skin is warm and dry.   Neurological:      General: No focal deficit present.      Mental Status: She is alert and oriented to person, place, and time.   Psychiatric:         Mood and Affect: Mood normal.         Behavior: Behavior normal.         Thought Content: Thought content normal.         Judgment: Judgment normal.       Assessment and Plan    Diagnoses and all orders for this visit:    1. Microscopic colitis, unspecified microscopic colitis type (Primary)    2. Gastroesophageal reflux disease, unspecified whether esophagitis present    3. Nausea    4. Belching    5. Bloating    6. Colon cancer screening    7. Adenomatous polyp of colon, unspecified part of colon    8. Abnormal CT of the abdomen    Other orders  -     predniSONE (DELTASONE) 5 MG tablet; Take 0.5 tablets by mouth Daily. Take 1/2 tab daily  Dispense: 45 tablet; Refill: 3           Patient Instructions   Continue omeprazole 40 mg once daily for GERD.     2.  Continue low dose prednisone at 2.5 mg for possible mesenteric panniculitis. Please discuss ongoing use of low dose prednisone with your GYN at your upcoming appt. May need to consider use of a daily calcium supplement such as Citracal.     3.  Next colonoscopy due September 2027.     4.  Continue high fiber diet.     5.  Plan for follow up in 6 months or sooner should symptoms worsen/recur.       Discussion:    GERD is well managed omeprazole 40 mg once daily which we will continue.  Patient's nausea has greatly improved.    Her abdominal pain has  significantly improved with very low-dose prednisone at 2.5 mg/day for possible mesenteric panniculitis noted on CT scan.  Patient has upcoming follow-up with GYN and plans to discuss use of prednisone.  She feels it is definitely helped her symptoms significantly.  May need to consider use of a supplement such as Citracal if we plan to keep the patient on low-dose prednisone.  Patient to maintain high-fiber diet.  Plan for next office up in 6 months, or sooner should her symptoms worsen or fail to improve.  Next colonoscopy due September 2027 patient is in recall accordingly.  Patient verbalized understanding above plan of care and is in agreement.  All questions answered and support provided.    EMR Dragon/Transcription Disclaimer:  This document has been Dictated utilizing Dragon dictation.

## 2023-11-17 NOTE — PATIENT INSTRUCTIONS
Continue omeprazole 40 mg once daily for GERD.     2.  Continue low dose prednisone at 2.5 mg for possible mesenteric panniculitis. Please discuss ongoing use of low dose prednisone with your GYN at your upcoming appt. May need to consider use of a daily calcium supplement such as Citracal.     3.  Next colonoscopy due September 2027.     4.  Continue high fiber diet.     5.  Plan for follow up in 6 months or sooner should symptoms worsen/recur.